# Patient Record
Sex: FEMALE | Race: WHITE | NOT HISPANIC OR LATINO | Employment: FULL TIME | URBAN - METROPOLITAN AREA
[De-identification: names, ages, dates, MRNs, and addresses within clinical notes are randomized per-mention and may not be internally consistent; named-entity substitution may affect disease eponyms.]

---

## 2022-01-24 ENCOUNTER — OCCUPATIONAL HEALTH (OUTPATIENT)
Dept: URGENT CARE | Facility: CLINIC | Age: 27
End: 2022-01-24

## 2022-01-24 PROCEDURE — 80305 PR DRUG SCREEN - 1: ICD-10-PCS | Mod: S$GLB,,, | Performed by: EMERGENCY MEDICINE

## 2022-01-24 PROCEDURE — 80305 DRUG TEST PRSMV DIR OPT OBS: CPT | Mod: S$GLB,,, | Performed by: EMERGENCY MEDICINE

## 2022-01-24 PROCEDURE — 99499 PHYSICAL - BASIC COMPLEXITY: ICD-10-PCS | Mod: S$GLB,,, | Performed by: NURSE PRACTITIONER

## 2022-01-24 PROCEDURE — 99499 UNLISTED E&M SERVICE: CPT | Mod: S$GLB,,, | Performed by: NURSE PRACTITIONER

## 2025-02-01 ENCOUNTER — OFFICE VISIT (OUTPATIENT)
Dept: URGENT CARE | Facility: CLINIC | Age: 30
End: 2025-02-01
Payer: COMMERCIAL

## 2025-02-01 VITALS
DIASTOLIC BLOOD PRESSURE: 80 MMHG | WEIGHT: 240 LBS | RESPIRATION RATE: 18 BRPM | BODY MASS INDEX: 40.97 KG/M2 | SYSTOLIC BLOOD PRESSURE: 126 MMHG | HEART RATE: 113 BPM | HEIGHT: 64 IN | TEMPERATURE: 99 F | OXYGEN SATURATION: 99 %

## 2025-02-01 DIAGNOSIS — R04.2 BLOODY SPUTUM: ICD-10-CM

## 2025-02-01 DIAGNOSIS — R05.9 COUGH, UNSPECIFIED TYPE: Primary | ICD-10-CM

## 2025-02-01 DIAGNOSIS — J40 BRONCHITIS: ICD-10-CM

## 2025-02-01 LAB
CTP QC/QA: YES
POC MOLECULAR INFLUENZA A AGN: NEGATIVE
POC MOLECULAR INFLUENZA B AGN: NEGATIVE

## 2025-02-01 PROCEDURE — 99204 OFFICE O/P NEW MOD 45 MIN: CPT | Mod: S$GLB,,, | Performed by: STUDENT IN AN ORGANIZED HEALTH CARE EDUCATION/TRAINING PROGRAM

## 2025-02-01 PROCEDURE — 87502 INFLUENZA DNA AMP PROBE: CPT | Mod: QW,,, | Performed by: STUDENT IN AN ORGANIZED HEALTH CARE EDUCATION/TRAINING PROGRAM

## 2025-02-01 NOTE — LETTER
February 1, 2025      [facility] - [address], [address]  [place] [address]  Phone: [phone]  Fax: [phone]       Patient: Latosha Ambriz   YOB: 1995  Date of Visit: 02/01/2025    To Whom It May Concern:    Geeta Ambriz  was at [facility] on 02/01/2025. The patient may return to work/school on 02/02/2025 with no restrictions. If you have any questions or concerns, or if I can be of further assistance, please do not hesitate to contact me.    Sincerely,    Sherri Barr MA

## 2025-02-01 NOTE — PROGRESS NOTES
"Subjective:      Patient ID: Latosha Ambriz is a 29 y.o. female.    Vitals:  height is 5' 4" (1.626 m) and weight is 108.9 kg (240 lb). Her oral temperature is 99 °F (37.2 °C). Her blood pressure is 126/80 and her pulse is 113 (abnormal). Her respiration is 18 and oxygen saturation is 99%.     Chief Complaint: Cough (Symptoms started on 2 days. Symptoms are the following: cough w/ mucus, nasal congestion, sore throat, bodyache, headache, blood in mucus . Symptoms treated with the following: nyquil, sinus congestion )    This is a 29 y.o. female who presents today with a chief complaint of Cough: Symptoms started on 2 days. Symptoms are the following: cough w/ mucus, nasal congestion, sore throat, bodyache, headache, blood in mucus . Symptoms treated with the following: nyquil, sinus congestion   Patient presents with:  Cough: Symptoms started on 2 days. Symptoms are the following: cough w/ mucus, nasal congestion, sore throat, bodyache, headache, blood in mucus . Symptoms treated with the following: nyquil, sinus congestion          Cough  This is a new problem. The current episode started in the past 7 days. The problem has been gradually worsening. The problem occurs constantly. The cough is Productive of sputum and productive of blood-tinged sputum. Associated symptoms include headaches, nasal congestion and a sore throat. Associated symptoms comments: bodyache. Treatments tried: nyquil, sinus congestion. The treatment provided mild relief.       HENT:  Positive for sore throat.    Respiratory:  Positive for cough.    Neurological:  Positive for headaches.      Objective:     Physical Exam   Constitutional: She is oriented to person, place, and time.   HENT:   Head: Normocephalic and atraumatic.   Nose: Nose normal.   Mouth/Throat: Oropharynx is clear.   Eyes: Pupils are equal, round, and reactive to light. Extraocular movement intact   Neck: Neck supple.   Cardiovascular: Normal rate and regular rhythm. "   Pulmonary/Chest: Effort normal and breath sounds normal.   Abdominal: Normal appearance.   Neurological: no focal deficit. She is alert and oriented to person, place, and time.   Skin: Skin is warm and dry.   Psychiatric: Her behavior is normal. Mood and thought content normal.       Assessment:     1. Cough, unspecified type    2. Bloody sputum    3. Bronchitis        Plan:       Cough, unspecified type  -     POCT Influenza A/B MOLECULAR    Bloody sputum  -     XR CHEST PA AND LATERAL; Future; Expected date: 02/01/2025    Bronchitis      Results for orders placed or performed in visit on 02/01/25   POCT Influenza A/B MOLECULAR    Collection Time: 02/01/25 11:31 AM   Result Value Ref Range    POC Molecular Influenza A Ag Negative Negative    POC Molecular Influenza B Ag Negative Negative     Acceptable Yes          Patient overall well appearing with normal vital signs.  Checks x-ray was ordered to assess bloody sputum.  CXR was WNL NAF.  Likely mild nosebleed due to nasal irritation due to illness and dry winter weather.  Gave return precautions and recommended over-the-counter medications.     CXR RESULTS: Lungs are clear.Normal cardiomediastinal silhouette.Normal pulmonary vascular distribution.No pleural effusion or pneumothorax.No acute osseous abnormality.